# Patient Record
(demographics unavailable — no encounter records)

---

## 2025-05-06 NOTE — PHYSICAL EXAM
[Alert] : alert [Normocephalic] : normocephalic [Flat Open Anterior [place]] : flat open anterior fontanelle [PERRL] : PERRL [Red Reflex Bilateral] : red reflex bilateral [Normally Placed Ears] : normally placed ears [Auricles Well Formed] : auricles well formed [Clear Tympanic membranes] : clear tympanic membranes [Light reflex present] : light reflex present [Bony landmarks visible] : bony landmarks visible [Nares Patent] : nares patent [Palate Intact] : palate intact [Uvula Midline] : uvula midline [Supple, full passive range of motion] : supple, full passive range of motion [Symmetric Chest Rise] : symmetric chest rise [Clear to Auscultation Bilaterally] : clear to auscultation bilaterally [Regular Rate and Rhythm] : regular rate and rhythm [S1, S2 present] : S1, S2 present [+2 Femoral Pulses] : +2 femoral pulses [Soft] : soft [Bowel Sounds] : bowel sounds present [Normal external genitailia] : normal external genitalia [Central Urethral Opening] : central urethral opening [Testicles Descended Bilaterally] : testicles descended bilaterally [Normally Placed] : normally placed [No Abnormal Lymph Nodes Palpated] : no abnormal lymph nodes palpated [Symmetric Flexed Extremities] : symmetric flexed extremities [Startle Reflex] : startle reflex present [Suck Reflex] : suck reflex present [Rooting] : rooting reflex present [Palmar Grasp] : palmar grasp reflex present [Plantar Grasp] : plantar grasp reflex present [Symmetric [place]] : symmetric [place] [Acute Distress] : no acute distress [Discharge] : no discharge [Palpable Masses] : no palpable masses [Murmurs] : no murmurs [Tender] : nontender [Distended] : not distended [Hepatomegaly] : no hepatomegaly [Splenomegaly] : no splenomegaly [Rees-Ortolani] : negative Rees-Ortolani [Spinal Dimple] : no spinal dimple [Tuft of Hair] : no tuft of hair [Jaundice] : no jaundice [Rash and/or lesion present] : no rash/lesion

## 2025-05-06 NOTE — HISTORY OF PRESENT ILLNESS
[Mother] : mother [Normal] : Normal [No] : No cigarette smoke exposure [Water heater temperature set at <120 degrees F] : Water heater temperature set at <120 degrees F [Rear facing car seat in back seat] : Rear facing car seat in back seat [Carbon Monoxide Detectors] : Carbon monoxide detectors at home [Smoke Detectors] : Smoke detectors at home. [At risk for exposure to TB] : Not at risk for exposure to Tuberculosis  [FreeTextEntry7] : well [de-identified] : none

## 2025-05-24 NOTE — DISCUSSION/SUMMARY
[FreeTextEntry1] : 50 day old w/ congestion. well appearing. normal vitals - discussed at length signs and symptoms of increased work of breathing including tachypnea, nasal flaring, and retractions - Recommended supportive care, including nasal suction, use of humidifiers, and steam - If new or worsening symptoms or parental concern - return to office or ED.

## 2025-07-14 NOTE — HISTORY OF PRESENT ILLNESS
[de-identified] : fell down 48 hrs ago [FreeTextEntry6] : fell off bed onto towel face down 2 days ago- witnessed  cried immediately without LOC has been acting normal self since. good PO intake, UOP and BMs

## 2025-07-14 NOTE — DISCUSSION/SUMMARY
[FreeTextEntry1] : well appearing- happy, smiling normal exam and new symptoms to call office and recheck PRN discussed s/s of when to go to the ER. verbalized understanding

## 2025-07-14 NOTE — PHYSICAL EXAM
[NL] : warm, clear [FreeTextEntry5] : ALEKSANDAR [de-identified] : cranial nerves I-VII grossly intact